# Patient Record
Sex: MALE | Race: WHITE | Employment: FULL TIME | URBAN - METROPOLITAN AREA
[De-identification: names, ages, dates, MRNs, and addresses within clinical notes are randomized per-mention and may not be internally consistent; named-entity substitution may affect disease eponyms.]

---

## 2022-10-12 ENCOUNTER — APPOINTMENT (OUTPATIENT)
Dept: CT IMAGING | Age: 22
End: 2022-10-12

## 2022-10-12 ENCOUNTER — APPOINTMENT (OUTPATIENT)
Dept: MRI IMAGING | Age: 22
End: 2022-10-12

## 2022-10-12 ENCOUNTER — HOSPITAL ENCOUNTER (EMERGENCY)
Age: 22
Discharge: HOME | End: 2022-10-12
Attending: EMERGENCY MEDICINE

## 2022-10-12 VITALS
HEIGHT: 74 IN | SYSTOLIC BLOOD PRESSURE: 138 MMHG | BODY MASS INDEX: 25.67 KG/M2 | RESPIRATION RATE: 16 BRPM | HEART RATE: 60 BPM | DIASTOLIC BLOOD PRESSURE: 92 MMHG | OXYGEN SATURATION: 98 % | WEIGHT: 200 LBS | TEMPERATURE: 98.2 F

## 2022-10-12 DIAGNOSIS — R51.9 NONINTRACTABLE HEADACHE, UNSPECIFIED CHRONICITY PATTERN, UNSPECIFIED HEADACHE TYPE: ICD-10-CM

## 2022-10-12 DIAGNOSIS — R11.2 NAUSEA AND VOMITING, UNSPECIFIED VOMITING TYPE: Primary | ICD-10-CM

## 2022-10-12 LAB
ALBUMIN SERPL-MCNC: 4.5 G/DL (ref 3.5–5)
ALBUMIN/GLOB SERPL: 1.3 {RATIO} (ref 0.4–1.6)
ALP SERPL-CCNC: 64 U/L (ref 50–136)
ALT SERPL-CCNC: 26 U/L (ref 12–65)
ANION GAP SERPL CALC-SCNC: 4 MMOL/L (ref 2–11)
AST SERPL-CCNC: 16 U/L (ref 15–37)
BASOPHILS # BLD: 0 K/UL (ref 0–0.2)
BASOPHILS NFR BLD: 1 % (ref 0–2)
BILIRUB SERPL-MCNC: 0.5 MG/DL (ref 0.2–1.1)
BILIRUB UR QL: NEGATIVE
BUN SERPL-MCNC: 15 MG/DL (ref 6–23)
CALCIUM SERPL-MCNC: 9.6 MG/DL (ref 8.3–10.4)
CHLORIDE SERPL-SCNC: 106 MMOL/L (ref 101–110)
CO2 SERPL-SCNC: 28 MMOL/L (ref 21–32)
CREAT SERPL-MCNC: 1.1 MG/DL (ref 0.8–1.5)
DIFFERENTIAL METHOD BLD: ABNORMAL
EOSINOPHIL # BLD: 0.1 K/UL (ref 0–0.8)
EOSINOPHIL NFR BLD: 1 % (ref 0.5–7.8)
ERYTHROCYTE [DISTWIDTH] IN BLOOD BY AUTOMATED COUNT: 11.9 % (ref 11.9–14.6)
GLOBULIN SER CALC-MCNC: 3.6 G/DL (ref 2.8–4.5)
GLUCOSE SERPL-MCNC: 106 MG/DL (ref 65–100)
GLUCOSE UR QL STRIP.AUTO: NEGATIVE MG/DL
HCT VFR BLD AUTO: 46.2 % (ref 41.1–50.3)
HGB BLD-MCNC: 15.5 G/DL (ref 13.6–17.2)
IMM GRANULOCYTES # BLD AUTO: 0 K/UL (ref 0–0.5)
IMM GRANULOCYTES NFR BLD AUTO: 0 % (ref 0–5)
KETONES UR-MCNC: NEGATIVE MG/DL
LEUKOCYTE ESTERASE UR QL STRIP: NEGATIVE
LIPASE SERPL-CCNC: 162 U/L (ref 73–393)
LYMPHOCYTES # BLD: 1.6 K/UL (ref 0.5–4.6)
LYMPHOCYTES NFR BLD: 31 % (ref 13–44)
MCH RBC QN AUTO: 29.7 PG (ref 26.1–32.9)
MCHC RBC AUTO-ENTMCNC: 33.5 G/DL (ref 31.4–35)
MCV RBC AUTO: 88.5 FL (ref 82–102)
MONOCYTES # BLD: 0.4 K/UL (ref 0.1–1.3)
MONOCYTES NFR BLD: 7 % (ref 4–12)
NEUTS SEG # BLD: 3.2 K/UL (ref 1.7–8.2)
NEUTS SEG NFR BLD: 60 % (ref 43–78)
NITRITE UR QL: NEGATIVE
NRBC # BLD: 0 K/UL (ref 0–0.2)
PH UR: 6.5 [PH] (ref 5–9)
PLATELET # BLD AUTO: 243 K/UL (ref 150–450)
PMV BLD AUTO: 9.1 FL (ref 9.4–12.3)
POTASSIUM SERPL-SCNC: 4.2 MMOL/L (ref 3.5–5.1)
PROT SERPL-MCNC: 8.1 G/DL (ref 6.3–8.2)
PROT UR QL: NEGATIVE MG/DL
RBC # BLD AUTO: 5.22 M/UL (ref 4.23–5.6)
RBC # UR STRIP: NEGATIVE /UL
SERVICE CMNT-IMP: ABNORMAL
SODIUM SERPL-SCNC: 138 MMOL/L (ref 133–143)
SP GR UR: 1.02 (ref 1–1.02)
UROBILINOGEN UR QL: 0.2 EU/DL (ref 0.2–1)
WBC # BLD AUTO: 5.3 K/UL (ref 4.3–11.1)

## 2022-10-12 PROCEDURE — 81003 URINALYSIS AUTO W/O SCOPE: CPT

## 2022-10-12 PROCEDURE — 6370000000 HC RX 637 (ALT 250 FOR IP): Performed by: STUDENT IN AN ORGANIZED HEALTH CARE EDUCATION/TRAINING PROGRAM

## 2022-10-12 PROCEDURE — 70450 CT HEAD/BRAIN W/O DYE: CPT

## 2022-10-12 PROCEDURE — 70553 MRI BRAIN STEM W/O & W/DYE: CPT

## 2022-10-12 PROCEDURE — 83690 ASSAY OF LIPASE: CPT

## 2022-10-12 PROCEDURE — 87086 URINE CULTURE/COLONY COUNT: CPT

## 2022-10-12 PROCEDURE — 96374 THER/PROPH/DIAG INJ IV PUSH: CPT | Performed by: EMERGENCY MEDICINE

## 2022-10-12 PROCEDURE — 2580000003 HC RX 258: Performed by: STUDENT IN AN ORGANIZED HEALTH CARE EDUCATION/TRAINING PROGRAM

## 2022-10-12 PROCEDURE — 85025 COMPLETE CBC W/AUTO DIFF WBC: CPT

## 2022-10-12 PROCEDURE — 6360000004 HC RX CONTRAST MEDICATION: Performed by: STUDENT IN AN ORGANIZED HEALTH CARE EDUCATION/TRAINING PROGRAM

## 2022-10-12 PROCEDURE — A9579 GAD-BASE MR CONTRAST NOS,1ML: HCPCS | Performed by: STUDENT IN AN ORGANIZED HEALTH CARE EDUCATION/TRAINING PROGRAM

## 2022-10-12 PROCEDURE — 80053 COMPREHEN METABOLIC PANEL: CPT

## 2022-10-12 PROCEDURE — 99285 EMERGENCY DEPT VISIT HI MDM: CPT | Performed by: EMERGENCY MEDICINE

## 2022-10-12 RX ORDER — SODIUM CHLORIDE 0.9 % (FLUSH) 0.9 %
10 SYRINGE (ML) INJECTION AS NEEDED
Status: COMPLETED | OUTPATIENT
Start: 2022-10-12 | End: 2022-10-12

## 2022-10-12 RX ORDER — PROCHLORPERAZINE MALEATE 10 MG
10 TABLET ORAL EVERY 6 HOURS PRN
Qty: 20 TABLET | Refills: 2 | Status: SHIPPED | OUTPATIENT
Start: 2022-10-12

## 2022-10-12 RX ORDER — ACETAMINOPHEN 500 MG
1000 TABLET ORAL
Status: COMPLETED | OUTPATIENT
Start: 2022-10-12 | End: 2022-10-12

## 2022-10-12 RX ADMIN — SODIUM CHLORIDE, PRESERVATIVE FREE 10 ML: 5 INJECTION INTRAVENOUS at 12:51

## 2022-10-12 RX ADMIN — ACETAMINOPHEN 1000 MG: 500 TABLET, FILM COATED ORAL at 10:08

## 2022-10-12 RX ADMIN — GADOTERIDOL 19 ML: 279.3 INJECTION, SOLUTION INTRAVENOUS at 12:51

## 2022-10-12 ASSESSMENT — ENCOUNTER SYMPTOMS
EYE DISCHARGE: 0
SHORTNESS OF BREATH: 0
SORE THROAT: 0
BLOOD IN STOOL: 0
VOMITING: 1
EYE REDNESS: 0
CHEST TIGHTNESS: 0
COLOR CHANGE: 0
SINUS PRESSURE: 0
VOICE CHANGE: 0
PHOTOPHOBIA: 0
DIARRHEA: 0
WHEEZING: 0
ABDOMINAL PAIN: 0
COUGH: 0
CONSTIPATION: 0
EYE PAIN: 0
RHINORRHEA: 0
APNEA: 0
NAUSEA: 1

## 2022-10-12 ASSESSMENT — PAIN SCALES - GENERAL: PAINLEVEL_OUTOF10: 4

## 2022-10-12 ASSESSMENT — VISUAL ACUITY: OU: 1

## 2022-10-12 ASSESSMENT — PAIN DESCRIPTION - FREQUENCY: FREQUENCY: INTERMITTENT

## 2022-10-12 ASSESSMENT — PAIN DESCRIPTION - LOCATION: LOCATION: ABDOMEN

## 2022-10-12 ASSESSMENT — PAIN DESCRIPTION - PAIN TYPE: TYPE: ACUTE PAIN

## 2022-10-12 ASSESSMENT — PAIN - FUNCTIONAL ASSESSMENT: PAIN_FUNCTIONAL_ASSESSMENT: 0-10

## 2022-10-12 ASSESSMENT — PAIN DESCRIPTION - DESCRIPTORS: DESCRIPTORS: ACHING

## 2022-10-12 NOTE — DISCHARGE INSTRUCTIONS
Your brain MRI was normal today. Your lab work today was normal.  You did not show any signs of internal bleeding. As of yet, we do not have a clear cause of your symptoms. It is important you follow-up on an outpatient basis to determine the cause of your vomiting and headaches. Please follow with your primary care provider. I provided you a number to call to establish care with 1 for further management. As we discussed, the neurologist would like to see you on an outpatient basis. Please call Leeanna Roblero NP to schedule an appointment. The number is above. In the meantime, I am writing you for Compazine which can help with nausea. Please return here if you develop any new or worsening symptoms, especially changes in your vision, loss of peripheral vision, fevers. We would love to help you get a primary care doctor for follow-up after your emergency department visit. Please call [phone] between 7AM - 6PM Monday to Friday. A care navigator will be able to assist you with setting up a doctor close to your home.

## 2022-10-12 NOTE — ED PROVIDER NOTES
Emergency Department Provider Note                   PCP:                None Provider               Age: 25 y.o. Sex: male       ICD-10-CM    1. Nausea and vomiting, unspecified vomiting type  R11.2       2. Nonintractable headache, unspecified chronicity pattern, unspecified headache type  R51.9 [address]     [address]          DISPOSITION Decision To Discharge 10/12/2022 02:02:15 PM        MDM  Number of Diagnoses or Management Options  Nausea and vomiting, unspecified vomiting type  Nonintractable headache, unspecified chronicity pattern, unspecified headache type  Diagnosis management comments: 9:43 AM  Ddx includes intracranial mass, elevated ICP, meningitis, GI bleed. Low suspicion for elevated ICP given no visual symptoms, no papilledema, not overweight, not female. Low suspicion for meningitis given the length of symptoms, lack of fever, lack of rashes, no meningeal signs. .  We will CT head to evaluate for intracranial mass. We will proceed with guaiac testing to evaluate for internal bleeding. If all is negative, will discharge with Compazine to go home with and primary care follow-up. 11:06 AM  CT scan showed possible hypoattenuation in occipital lobe. Possible artifact. Radiologist recommended MRI for further delineation. We will proceed with this imaging. Discussed this patient with my attending physician Dr. Deepak Severino. He recommended consulting neurology to see if they would like to see this patient on an outpatient basis. We will consult neurology now. Labs were all unremarkable. Guaiac was negative. Vision and visual fields normal.    2:23 PM  MRI brain was within normal limits and without acute abnormality. Consulted Dr. Edith Olmedo of neurology to update him on patient. He advised this patient could be followed on an outpatient basis.   Given the patient's grossly unremarkable exam, unremarkable work-up, lack of symptoms at time, I do feel he is safe to be discharged home. I counseled him on warning signs return immediately for including but not limited to visual changes, fevers. Counseled him on importance of following up with neurology and primary care provider for further management of his symptoms. Patient and mother verbalized understanding and are in agreement with treatment plan. Patient was discharged in stable condition. Compazine plus Fioricet. Follow-up with family doctor and neurologist.  Sj Lopez       Amount and/or Complexity of Data Reviewed  Clinical lab tests: ordered and reviewed  Tests in the radiology section of CPT®: ordered and reviewed  Discuss the patient with other providers: yes (Dr. Latrice Edwards of neurology, Dr. Sj Lopez)  Independent visualization of images, tracings, or specimens: yes    Risk of Complications, Morbidity, and/or Mortality  Presenting problems: moderate  Diagnostic procedures: moderate  Management options: moderate    Patient Progress  Patient progress: stable       ED Course as of 10/12/22 1425   Wed Oct 12, 2022   1155 11:55 AM  Patient just got taken to MRI [NR]   1401 2:01 PM  MRI within normal limits.  [NR]      ED Course User Index  [NR] LancasterLARISSA Delacruz        Orders Placed This Encounter   Procedures    Urine Culture    CT HEAD WO CONTRAST    MRI BRAIN W WO CONTRAST    CBC with Diff    CMP    Lipase    [address]    [address] NPO    POCT Urine Dipstick    POCT Urinalysis no Micro    Saline lock IV        Medications   acetaminophen (TYLENOL) tablet 1,000 mg (1,000 mg Oral Given 10/12/22 1008)   sodium chloride flush 0.9 % injection 10 mL (10 mLs IntraVENous Given 10/12/22 1251)   gadoteridol (PROHANCE) injection 19 mL (19 mLs IntraVENous Given 10/12/22 1251)       New Prescriptions    PROCHLORPERAZINE (COMPAZINE) 10 MG TABLET    Take 1 tablet by mouth every 6 hours as needed (nausea)        Mauri Jean-Baptiste is a 25 y.o. male who presents to the Emergency Department with chief complaint of    Chief Complaint   Patient presents with    Abdominal Pain      75-year-old male patient with no significant past medical history presents today complaining of vomiting in the morning for the past several months. He reports he typically wakes up feeling hot and nauseated. Immediately goes to the bathroom and vomits. Reports after vomiting he will sometimes get a minor headache that last for an hour or 2 and is amendable with Tylenol. He states this happens almost every morning. He cannot think of anything that triggers it. He does note that for the past week he has had some darker stool than normal but otherwise denies any associated symptoms. He denies headaches being worst of life, sudden in onset, maximal in onset. He denies fevers, neck stiffness, rashes. He denies visual changes, blurry vision, double vision, loss of vision. He denies abdominal pain despite what the chief complaint of this chart says. He denies chest pain or shortness of breath. He denies unintentional weight loss. Patient is primary historian and quality is reliable. The history is provided by the patient and a parent. No  was used. Review of Systems   Constitutional:  Negative for appetite change, chills, fatigue, fever and unexpected weight change. HENT:  Negative for congestion, ear pain, hearing loss, postnasal drip, rhinorrhea, sinus pressure, sore throat and voice change. Eyes:  Negative for photophobia, pain, discharge, redness and visual disturbance. Respiratory:  Negative for apnea, cough, chest tightness, shortness of breath and wheezing. Cardiovascular:  Negative for chest pain, palpitations and leg swelling. Gastrointestinal:  Positive for nausea and vomiting. Negative for abdominal pain, blood in stool, constipation and diarrhea. Endocrine: Negative for polydipsia, polyphagia and polyuria.    Genitourinary: Negative for decreased urine volume, dysuria, flank pain, frequency and hematuria. Musculoskeletal:  Negative for arthralgias, joint swelling, myalgias and neck stiffness. Skin:  Negative for color change, rash and wound. Neurological:  Positive for headaches. Negative for dizziness, syncope, speech difficulty, weakness and light-headedness. Hematological:  Negative for adenopathy. Does not bruise/bleed easily. Psychiatric/Behavioral:  Negative for confusion, self-injury, sleep disturbance and suicidal ideas. All other systems reviewed and are negative. History reviewed. No pertinent past medical history. History reviewed. No pertinent surgical history. History reviewed. No pertinent family history. Social History     Socioeconomic History    Marital status: Single     Spouse name: None    Number of children: None    Years of education: None    Highest education level: None         Patient has no known allergies. Previous Medications    No medications on file        Vitals signs and nursing note reviewed. Patient Vitals for the past 4 hrs:   Pulse BP SpO2   10/12/22 1136 -- -- 98 %   10/12/22 1130 -- (!) 138/92 99 %   10/12/22 1056 -- -- 98 %   10/12/22 1055 -- 121/77 --   10/12/22 1030 60 (!) 136/96 --   10/12/22 1029 -- -- 99 %          Physical Exam  Vitals and nursing note reviewed. Exam conducted with a chaperone present Katrin Enriquez RN present for exam). Constitutional:       General: He is not in acute distress. Appearance: Normal appearance. He is normal weight. He is not ill-appearing, toxic-appearing or diaphoretic. Comments: Resting comfortably in stretcher. Pleasant cooperative. No acute distress. HENT:      Head: Normocephalic and atraumatic. Comments: Nontender to palpation. No temporal artery tenderness or scalp tenderness. No deformities or signs of fracture or trauma.      Right Ear: Tympanic membrane, ear canal and external ear normal. Left Ear: Tympanic membrane, ear canal and external ear normal.      Nose: Nose normal.      Mouth/Throat:      Mouth: Mucous membranes are moist.      Pharynx: Oropharynx is clear. No pharyngeal swelling or oropharyngeal exudate. Eyes:      General: Lids are normal. Lids are everted, no foreign bodies appreciated. Vision grossly intact. Gaze aligned appropriately. No visual field deficit or scleral icterus. Right eye: No discharge. Left eye: No discharge. Extraocular Movements: Extraocular movements intact. Right eye: Normal extraocular motion and no nystagmus. Left eye: Normal extraocular motion and no nystagmus. Conjunctiva/sclera: Conjunctivae normal.      Right eye: Right conjunctiva is not injected. Left eye: Left conjunctiva is not injected. Pupils: Pupils are equal, round, and reactive to light. Funduscopic exam:     Right eye: No papilledema. Red reflex present. Left eye: No papilledema. Red reflex present. Visual Fields: Right eye visual fields normal and left eye visual fields normal.      Comments: Vision grossly normal for patient. No photosensitivity. No obvious papilledema although difficult to assess without fully dilated eye. Full EOMs. Visual fields normal.   Neck:      Comments: No meningeal signs. Full ROM of neck without pain. Patient able to touch chin to chest with mouth closed. Cardiovascular:      Rate and Rhythm: Normal rate and regular rhythm. Pulses: Normal pulses. Heart sounds: Normal heart sounds. No murmur heard. Pulmonary:      Effort: Pulmonary effort is normal. No respiratory distress. Breath sounds: Normal breath sounds. No wheezing, rhonchi or rales. Abdominal:      General: Abdomen is flat. Bowel sounds are normal.      Palpations: Abdomen is soft. Tenderness: There is no abdominal tenderness. There is no guarding or rebound. Hernia: No hernia is present.       Comments: Normal abdominal exam without peritoneal signs. No masses felt. Genitourinary:     Penis: Normal.       Testes: Normal.      Prostate: Normal.      Rectum: Normal. Guaiac result negative. Comments: Negative guaiac  Musculoskeletal:         General: Normal range of motion. Cervical back: Normal range of motion and neck supple. No rigidity or tenderness. Right lower leg: No edema. Left lower leg: No edema. Lymphadenopathy:      Cervical: No cervical adenopathy. Skin:     General: Skin is warm and dry. Capillary Refill: Capillary refill takes less than 2 seconds. Findings: No lesion or rash. Comments: No rashes or petechiae. No lymphadenopathy. Neurological:      General: No focal deficit present. Mental Status: He is alert and oriented to person, place, and time. Mental status is at baseline. GCS: GCS eye subscore is 4. GCS verbal subscore is 5. GCS motor subscore is 6. Cranial Nerves: Cranial nerves 2-12 are intact. No cranial nerve deficit, dysarthria or facial asymmetry. Sensory: Sensation is intact. No sensory deficit. Motor: Motor function is intact. No weakness, tremor, atrophy, abnormal muscle tone, seizure activity or pronator drift. Coordination: Coordination is intact. Romberg sign negative. Coordination normal. Finger-Nose-Finger Test and Heel to Santosh Lares Test normal. Rapid alternating movements normal.      Gait: Gait is intact. Gait and tandem walk normal.      Deep Tendon Reflexes: Reflexes are normal and symmetric. Comments: Normal neurologic exam without focal neurologic deficit. Psychiatric:         Mood and Affect: Mood normal.         Behavior: Behavior normal.         Thought Content:  Thought content normal.         Judgment: Judgment normal.          Procedures    Results for orders placed or performed during the hospital encounter of 10/12/22   CT HEAD WO CONTRAST    Narrative    EXAM: CT HEAD WITHOUT CONTRAST    INDICATION: vomiting in the morning for several months. headaches. COMPARISON: None. TECHNIQUE: Contiguous axial images were obtained from the skull base through the  vertex without IV contrast. Radiation dose reduction techniques were used for  this study. Our CT scanners use one or all of the following: Automated exposure  control, adjustment of the mA and/or kV according to patient size, iterative  reconstruction. FINDINGS:   Subtle asymmetric hypoattenuation with loss of gray-white matter differentiation  of the left occipital lobe. No hydrocephalus or midline shift. No extra-axial  mass or hemorrhage. The basal cisterns are patent. The visualized portions of the orbits are normal. The mastoid air cells and  paranasal sinuses are patent. The visualized vascular structures have an unremarkable noncontrast appearance. The calvarium and soft tissues appear normal.      Impression    Subtle asymmetric hypoattenuation with loss of gray-white matter differentiation  of the left occipital lobe, indeterminate and possibly artifactual. Recommend  brain MRI with and without contrast.   MRI BRAIN W WO CONTRAST    Narrative    Exam: MRI BRAIN W WO CONTRAST on 10/12/2022 1:43 PM    Clinical History: The Male patient is 25years old presenting for nausea and  vomiting. Comparison:  Head CT 10/12/2022. Technique:   T2-weighted, T1-weighted, FLAIR, and diffusion-weighted transaxial  , T1 sagittal, and gradient echo coronal pulse sequences were initially  performed. Following  the administration of contrast, additional T1-weighted  transaxial and coronal sequences were performed. 19 mL of ProHance contrast was  administered intravenously. Findings: There is no evidence of acute intracranial abnormality. No evidence of  restricted diffusion is seen to suggest acute ischemia. There are no areas of abnormal enhancement. There are no abnormal extra-axial  fluid collections.  No evidence of mass or mass effect is seen. Expected flow  voids are maintained in the major intracranial vessels. The cerebellum and brainstem are unremarkable. There is no evidence of Chiari  malformation. The ventricular system and CSF containing spaces are unremarkable in appearance. Visualized extracranial soft tissues are unremarkable. The paranasal sinuses are well pneumatized and aerated. Impression    1. No acute intracranial abnormality.     CPT Code:  72797             CBC with Diff   Result Value Ref Range    WBC 5.3 4.3 - 11.1 K/uL    RBC 5.22 4.23 - 5.6 M/uL    Hemoglobin 15.5 13.6 - 17.2 g/dL    Hematocrit 46.2 41.1 - 50.3 %    MCV 88.5 82 - 102 FL    MCH 29.7 26.1 - 32.9 PG    MCHC 33.5 31.4 - 35.0 g/dL    RDW 11.9 11.9 - 14.6 %    Platelets 874 821 - 561 K/uL    MPV 9.1 (L) 9.4 - 12.3 FL    nRBC 0.00 0.0 - 0.2 K/uL    Differential Type AUTOMATED      Seg Neutrophils 60 43 - 78 %    Lymphocytes 31 13 - 44 %    Monocytes 7 4.0 - 12.0 %    Eosinophils % 1 0.5 - 7.8 %    Basophils 1 0.0 - 2.0 %    Immature Granulocytes 0 0.0 - 5.0 %    Segs Absolute 3.2 1.7 - 8.2 K/UL    Absolute Lymph # 1.6 0.5 - 4.6 K/UL    Absolute Mono # 0.4 0.1 - 1.3 K/UL    Absolute Eos # 0.1 0.0 - 0.8 K/UL    Basophils Absolute 0.0 0.0 - 0.2 K/UL    Absolute Immature Granulocyte 0.0 0.0 - 0.5 K/UL   CMP   Result Value Ref Range    Sodium 138 133 - 143 mmol/L    Potassium 4.2 3.5 - 5.1 mmol/L    Chloride 106 101 - 110 mmol/L    CO2 28 21 - 32 mmol/L    Anion Gap 4 2 - 11 mmol/L    Glucose 106 (H) 65 - 100 mg/dL    BUN 15 6 - 23 MG/DL    Creatinine 1.10 0.8 - 1.5 MG/DL    Est, Glom Filt Rate >60 >60 ml/min/1.73m2    Calcium 9.6 8.3 - 10.4 MG/DL    Total Bilirubin 0.5 0.2 - 1.1 MG/DL    ALT 26 12 - 65 U/L    AST 16 15 - 37 U/L    Alk Phosphatase 64 50 - 136 U/L    Total Protein 8.1 6.3 - 8.2 g/dL    Albumin 4.5 3.5 - 5.0 g/dL    Globulin 3.6 2.8 - 4.5 g/dL    Albumin/Globulin Ratio 1.3 0.4 - 1.6     Lipase   Result Value Ref Range    Lipase 162 73 - 393 U/L   POCT Urinalysis no Micro   Result Value Ref Range    Specific Gravity, Urine, POC 1.025 (H) 1.001 - 1.023      pH, Urine, POC 6.5 5.0 - 9.0      Protein, Urine, POC Negative NEG mg/dL    Glucose, UA POC Negative NEG mg/dL    Ketones, Urine, POC Negative NEG mg/dL    Bilirubin, Urine, POC Negative NEG      Blood, UA POC Negative NEG      URINE UROBILINOGEN POC 0.2 0.2 - 1.0 EU/dL    Nitrate, Urine, POC Negative NEG      Leukocyte Est, UA POC Negative NEG      Performed by: Crissy Cornejo         MRI BRAIN W WO CONTRAST   Final Result      1. No acute intracranial abnormality. CPT Code:  95692                     CT HEAD WO CONTRAST   Final Result   Subtle asymmetric hypoattenuation with loss of gray-white matter differentiation   of the left occipital lobe, indeterminate and possibly artifactual. Recommend   brain MRI with and without contrast.                          Voice dictation software was used during the making of this note. This software is not perfect and grammatical and other typographical errors may be present. This note has not been completely proofread for errors.      Cristopher Mojica  10/12/22 2352

## 2022-10-12 NOTE — Clinical Note
Mary Alice Montgomery was seen and treated in our emergency department on 10/12/2022. He may return to work on 10/17/2022. If you have any questions or concerns, please don't hesitate to call.       Elvia Thompson, [address]

## 2022-10-12 NOTE — ED TRIAGE NOTES
Patient arrives to the ED via POV and ambulatory to triage. Patient states for 2 weeks he feels sick in the mornings and has missed work. Patient wakes up feeling hot, nauseated with vomiting. Patient states this issue is just in the morning and starts to feel better through the day, patient also states his stool has been darker than normal and dark green at times.

## 2022-10-14 ENCOUNTER — OFFICE VISIT (OUTPATIENT)
Dept: INTERNAL MEDICINE CLINIC | Facility: CLINIC | Age: 22
End: 2022-10-14

## 2022-10-14 VITALS
WEIGHT: 204 LBS | HEART RATE: 70 BPM | DIASTOLIC BLOOD PRESSURE: 84 MMHG | SYSTOLIC BLOOD PRESSURE: 146 MMHG | OXYGEN SATURATION: 99 % | BODY MASS INDEX: 26.18 KG/M2 | HEIGHT: 74 IN

## 2022-10-14 DIAGNOSIS — R51.9 NONINTRACTABLE EPISODIC HEADACHE, UNSPECIFIED HEADACHE TYPE: ICD-10-CM

## 2022-10-14 DIAGNOSIS — R11.2 NAUSEA AND VOMITING, UNSPECIFIED VOMITING TYPE: Primary | ICD-10-CM

## 2022-10-14 LAB
BACTERIA SPEC CULT: NORMAL
SERVICE CMNT-IMP: NORMAL

## 2022-10-14 PROCEDURE — 99204 OFFICE O/P NEW MOD 45 MIN: CPT | Performed by: STUDENT IN AN ORGANIZED HEALTH CARE EDUCATION/TRAINING PROGRAM

## 2022-10-14 ASSESSMENT — PATIENT HEALTH QUESTIONNAIRE - PHQ9
2. FEELING DOWN, DEPRESSED OR HOPELESS: 0
SUM OF ALL RESPONSES TO PHQ QUESTIONS 1-9: 0
SUM OF ALL RESPONSES TO PHQ QUESTIONS 1-9: 0
SUM OF ALL RESPONSES TO PHQ9 QUESTIONS 1 & 2: 0
SUM OF ALL RESPONSES TO PHQ QUESTIONS 1-9: 0
SUM OF ALL RESPONSES TO PHQ QUESTIONS 1-9: 0
1. LITTLE INTEREST OR PLEASURE IN DOING THINGS: 0

## 2022-10-14 ASSESSMENT — ENCOUNTER SYMPTOMS
VOMITING: 1
SHORTNESS OF BREATH: 0
NAUSEA: 1
ABDOMINAL PAIN: 0

## 2022-10-14 NOTE — PATIENT INSTRUCTIONS
Take prilosec or nexium 40mg total every night for 2 weeks, stop if you do not have relief of symptoms  Increase your fiber intake (metamucil)  Eat fruits and vegetables, try a small piece every day, it is good for you.

## 2022-10-14 NOTE — PROGRESS NOTES
SUBJECTIVE:   Tresa Gonzalez is a 25 y.o. male seen for a visit regarding   Chief Complaint   Patient presents with    New Patient     New pt here to get established    Nausea & Vomiting     For last 2 weeks-- has gone to ER for it. Diarrhea     Sometimes with diarrhea as well    Headache     Occ. 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 Guaiac was negative. CT head without contrast showed subtle asymmetric hypoattenuation of left occipital lobe, MRI brain with and without contrast showed no evidence of acute intracranial abnormality or restricted diffusion to suggest acute ischemia, no areas of abnormal enhancement or extra-axial fluid collections, no mass or mass-effect. Still having nausea, vomiting in the AM which first noticed a few months ago, late July however has been more frequent. He has been trying to eat earlier and avoid steak. Wakes up in the AM hot, nauseated, has diarrhea described as loose stool, vomits. He has not picked up nausea medication yet prescribed from ED. Noticed dark green stools at one point however now they are normal.  No abdominal pain, weight loss, bloody stool, hematemesis. Nausea goes away throughout the day. Denies abdominal pain, sometimes he gets reflux. He was trying nexium every night, the other day tried taking 2. He doesn't eat fruits or vegetables due to taste. He does smoke marijuana occasionally. Headaches occasionally after vomiting, nonpositional. Occipital, pounding. Headache present even with tylenol. He was referred to a neurologist from the ED. Past Medical History, Past Surgical History, Family history, Social History, and Medications were all reviewed with the patient today and updated as necessary.        Current Outpatient Medications   Medication Sig Dispense Refill    prochlorperazine (COMPAZINE) 10 MG tablet Take 1 tablet by mouth every 6 hours as needed (nausea) 20 tablet 2     No current facility-administered medications for this visit. No Known Allergies  Patient Active Problem List   Diagnosis    Nausea and vomiting       Social History     Tobacco Use    Smoking status: Never    Smokeless tobacco: Current     Types: Chew   Substance Use Topics    Alcohol use: Not Currently          Review of Systems   Constitutional:  Negative for chills and fever. HENT:  Negative for congestion. Eyes:  Negative for visual disturbance. Respiratory:  Negative for shortness of breath. Cardiovascular:  Negative for chest pain. Gastrointestinal:  Positive for nausea and vomiting. Negative for abdominal pain. Musculoskeletal:  Negative for arthralgias. Neurological:  Positive for headaches. Negative for syncope. OBJECTIVE:    Vitals:    10/14/22 1057   BP: (!) 146/84   Pulse: 70   SpO2: 99%   Weight: 204 lb (92.5 kg)   Height: 6' 2\" (1.88 m)        Physical Exam  Constitutional:       General: He is not in acute distress. Appearance: Normal appearance. HENT:      Head: Normocephalic and atraumatic. Eyes:      Extraocular Movements: Extraocular movements intact. Cardiovascular:      Rate and Rhythm: Normal rate and regular rhythm. Heart sounds: Normal heart sounds. Pulmonary:      Breath sounds: Normal breath sounds. Abdominal:      General: There is no distension. Palpations: Abdomen is soft. Tenderness: There is no abdominal tenderness. There is no guarding. Musculoskeletal:         General: No deformity. Skin:     General: Skin is warm and dry. Neurological:      Mental Status: He is alert. Mental status is at baseline. Psychiatric:         Mood and Affect: Mood normal.          Medical problems and test results were reviewed with the patient today. ASSESSMENT and PLAN     1. Nausea and vomiting, unspecified vomiting type  2. Nonintractable episodic headache, unspecified headache type     Symptoms could be from poor diet or marijuana use. Advised patient to try Nexium 40 mg every night for his symptoms, stop marijuana use, eat fruits and vegetables and increase fiber in his diet. Advised him to try nausea medication and let us know if this does not help. If his symptoms continue after these interventions, will refer to GI. Advised patient if symptoms worsen or if he develops fever or chills to let us know or seek emergency medical assistance.     Return in about 4 weeks (around 11/11/2022) for routine follow up, may be virtual.     Elaina Tovar MD

## 2023-08-07 ENCOUNTER — APPOINTMENT (OUTPATIENT)
Dept: ULTRASOUND IMAGING | Age: 23
End: 2023-08-07

## 2023-08-07 ENCOUNTER — APPOINTMENT (OUTPATIENT)
Dept: CT IMAGING | Age: 23
End: 2023-08-07

## 2023-08-07 ENCOUNTER — HOSPITAL ENCOUNTER (EMERGENCY)
Age: 23
Discharge: HOME | End: 2023-08-07
Attending: EMERGENCY MEDICINE

## 2023-08-07 VITALS
OXYGEN SATURATION: 100 % | WEIGHT: 195 LBS | HEIGHT: 74 IN | HEART RATE: 68 BPM | SYSTOLIC BLOOD PRESSURE: 145 MMHG | DIASTOLIC BLOOD PRESSURE: 85 MMHG | TEMPERATURE: 97.6 F | RESPIRATION RATE: 18 BRPM | BODY MASS INDEX: 25.03 KG/M2

## 2023-08-07 DIAGNOSIS — R59.0 INGUINAL LYMPHADENOPATHY: Primary | ICD-10-CM

## 2023-08-07 LAB
ANION GAP SERPL CALC-SCNC: 5 MMOL/L (ref 2–11)
APPEARANCE UR: CLEAR
BILIRUB UR QL: NEGATIVE
BUN SERPL-MCNC: 10 MG/DL (ref 6–23)
CALCIUM SERPL-MCNC: 10.6 MG/DL (ref 8.3–10.4)
CHLORIDE SERPL-SCNC: 105 MMOL/L (ref 101–110)
CO2 SERPL-SCNC: 29 MMOL/L (ref 21–32)
COLOR UR: ABNORMAL
CREAT SERPL-MCNC: 1.2 MG/DL (ref 0.8–1.5)
ERYTHROCYTE [DISTWIDTH] IN BLOOD BY AUTOMATED COUNT: 12 % (ref 11.9–14.6)
GLUCOSE SERPL-MCNC: 95 MG/DL (ref 65–100)
GLUCOSE UR STRIP.AUTO-MCNC: NEGATIVE MG/DL
HCT VFR BLD AUTO: 49.3 % (ref 41.1–50.3)
HGB BLD-MCNC: 16.5 G/DL (ref 13.6–17.2)
HGB UR QL STRIP: NEGATIVE
KETONES UR QL STRIP.AUTO: NEGATIVE MG/DL
LEUKOCYTE ESTERASE UR QL STRIP.AUTO: NEGATIVE
MCH RBC QN AUTO: 29.6 PG (ref 26.1–32.9)
MCHC RBC AUTO-ENTMCNC: 33.5 G/DL (ref 31.4–35)
MCV RBC AUTO: 88.4 FL (ref 82–102)
NITRITE UR QL STRIP.AUTO: NEGATIVE
NRBC # BLD: 0 K/UL (ref 0–0.2)
PH UR STRIP: 8 (ref 5–9)
PLATELET # BLD AUTO: 219 K/UL (ref 150–450)
PMV BLD AUTO: 9.3 FL (ref 9.4–12.3)
POTASSIUM SERPL-SCNC: 4.5 MMOL/L (ref 3.5–5.1)
PROT UR STRIP-MCNC: NEGATIVE MG/DL
RBC # BLD AUTO: 5.58 M/UL (ref 4.23–5.6)
SODIUM SERPL-SCNC: 139 MMOL/L (ref 133–143)
SP GR UR REFRACTOMETRY: >1.035 (ref 1–1.02)
UROBILINOGEN UR QL STRIP.AUTO: 0.2 EU/DL (ref 0.2–1)
WBC # BLD AUTO: 5.6 K/UL (ref 4.3–11.1)

## 2023-08-07 PROCEDURE — 87591 N.GONORRHOEAE DNA AMP PROB: CPT

## 2023-08-07 PROCEDURE — 87491 CHLMYD TRACH DNA AMP PROBE: CPT

## 2023-08-07 PROCEDURE — 6360000004 HC RX CONTRAST MEDICATION: Performed by: EMERGENCY MEDICINE

## 2023-08-07 PROCEDURE — 81003 URINALYSIS AUTO W/O SCOPE: CPT

## 2023-08-07 PROCEDURE — 85027 COMPLETE CBC AUTOMATED: CPT

## 2023-08-07 PROCEDURE — 99285 EMERGENCY DEPT VISIT HI MDM: CPT

## 2023-08-07 PROCEDURE — 72193 CT PELVIS W/DYE: CPT

## 2023-08-07 PROCEDURE — 80048 BASIC METABOLIC PNL TOTAL CA: CPT

## 2023-08-07 RX ORDER — DOXYCYCLINE HYCLATE 100 MG
100 TABLET ORAL 2 TIMES DAILY
Qty: 20 TABLET | Refills: 0 | Status: SHIPPED | OUTPATIENT
Start: 2023-08-07 | End: 2023-08-17

## 2023-08-07 RX ADMIN — IOPAMIDOL 100 ML: 755 INJECTION, SOLUTION INTRAVENOUS at 10:40

## 2023-08-07 ASSESSMENT — ENCOUNTER SYMPTOMS
DIARRHEA: 0
VOMITING: 0
NAUSEA: 0
ABDOMINAL PAIN: 0

## 2023-08-07 ASSESSMENT — PAIN DESCRIPTION - DESCRIPTORS: DESCRIPTORS: ACHING

## 2023-08-07 ASSESSMENT — PAIN SCALES - GENERAL: PAINLEVEL_OUTOF10: 7

## 2023-08-07 ASSESSMENT — PAIN DESCRIPTION - ORIENTATION: ORIENTATION: RIGHT

## 2023-08-07 ASSESSMENT — LIFESTYLE VARIABLES
HOW OFTEN DO YOU HAVE A DRINK CONTAINING ALCOHOL: NEVER
HOW MANY STANDARD DRINKS CONTAINING ALCOHOL DO YOU HAVE ON A TYPICAL DAY: PATIENT DOES NOT DRINK

## 2023-08-07 ASSESSMENT — PAIN DESCRIPTION - LOCATION: LOCATION: GROIN

## 2023-08-07 ASSESSMENT — PAIN - FUNCTIONAL ASSESSMENT: PAIN_FUNCTIONAL_ASSESSMENT: 0-10

## 2023-08-07 NOTE — ED PROVIDER NOTES
Emergency Department Provider Note       PCP: On File Not (Inactive)   Age: 21 y.o. Sex: male     DISPOSITION Decision To Discharge 08/07/2023 01:15:32 PM       ICD-10-CM    1. Inguinal lymphadenopathy  R59.0           Medical Decision Making     Complexity of Problems Addressed:  1 or more acute illnesses that pose a threat to life or bodily function. Data Reviewed and Analyzed:  Category 1:   I independently ordered and reviewed each unique test.         Category 2:   I interpreted the CT Scan no hernia, inquinal lymph node present. Category 3: Discussion of management or test interpretation. Patient presented with concern for hernia. He had swelling and bulging about 3 cm diameter in the right medial thigh distal to the right inguinal area. Area was tender but not warm or erythematous. It had the feeling of a lipoma or possibly a lymph node. Bedside ED ultrasound showed a fluid collection with small amounts of debris but no intestinal motility. CT scan confirmed that it was not a femoral hernia and that it was an inguinal lymph node. Bladder wall was thickened on CT but nondistended. Urine sent for urinalysis. Patient denies STD D risk factors but will be tested with a  chlamydia probe. I will place patient on antibiotics and have him follow-up with a primary care provider in 2 weeks for follow-up and recheck. Referral at that time for biopsy if needed. I discussed the case with surgery and they asked for primary care follow-up versus following up with them. The management of this patient was discussed with an external consultant. Surgery preferred CT over ultrasound to determine if there is femoral hernia. 1:18 PM  Inguinal lymph node. No hernia. No UTI. Treating lymphadenitis with doxycycline. Urine sent for gonorrhea and Chlamydia testing but patient denies risk for this. Follow-up with family doctor provided in 1 to 2 weeks for follow-up and recheck.   Surgery asked that

## 2023-08-07 NOTE — DISCHARGE INSTRUCTIONS
The antibiotic as prescribed twice daily for 10 days. Follow-up in 1 to 2 weeks with the physician provided for follow-up and recheck. They can refer you for biopsy if needed at that time. Return if any new, worsening or concerning symptoms.

## 2023-08-07 NOTE — ED TRIAGE NOTES
Per patient r sided inguinal hernia x7 days prior to arrival. States pain x14 days that has progressively became worse. Patient denies n/v/d states increased flatulence. Denies gu complications. Denies fever/chills.

## 2023-08-09 LAB
C TRACH RRNA SPEC QL NAA+PROBE: NEGATIVE
N GONORRHOEA RRNA SPEC QL NAA+PROBE: NEGATIVE
SPECIMEN SOURCE: NORMAL

## 2024-03-07 ENCOUNTER — HOSPITAL ENCOUNTER (EMERGENCY)
Age: 24
Discharge: HOME | End: 2024-03-07
Attending: EMERGENCY MEDICINE

## 2024-03-07 VITALS
BODY MASS INDEX: 25.41 KG/M2 | OXYGEN SATURATION: 98 % | WEIGHT: 198 LBS | RESPIRATION RATE: 16 BRPM | HEIGHT: 74 IN | HEART RATE: 63 BPM | TEMPERATURE: 98.1 F | SYSTOLIC BLOOD PRESSURE: 145 MMHG | DIASTOLIC BLOOD PRESSURE: 90 MMHG

## 2024-03-07 DIAGNOSIS — K04.7 DENTAL INFECTION: Primary | ICD-10-CM

## 2024-03-07 PROCEDURE — 99283 EMERGENCY DEPT VISIT LOW MDM: CPT

## 2024-03-07 RX ORDER — PENICILLIN V POTASSIUM 500 MG/1
500 TABLET ORAL 3 TIMES DAILY
Qty: 21 TABLET | Refills: 0 | Status: SHIPPED | OUTPATIENT
Start: 2024-03-07 | End: 2024-03-14

## 2024-03-07 ASSESSMENT — PAIN DESCRIPTION - LOCATION: LOCATION: TEETH

## 2024-03-07 ASSESSMENT — LIFESTYLE VARIABLES
HOW MANY STANDARD DRINKS CONTAINING ALCOHOL DO YOU HAVE ON A TYPICAL DAY: PATIENT DOES NOT DRINK
HOW OFTEN DO YOU HAVE A DRINK CONTAINING ALCOHOL: NEVER

## 2024-03-07 ASSESSMENT — PAIN DESCRIPTION - ORIENTATION: ORIENTATION: RIGHT;LOWER

## 2024-03-07 ASSESSMENT — PAIN SCALES - GENERAL
PAINLEVEL_OUTOF10: 10
PAINLEVEL_OUTOF10: 10

## 2024-03-07 ASSESSMENT — ENCOUNTER SYMPTOMS
COUGH: 0
SHORTNESS OF BREATH: 0

## 2024-03-07 NOTE — DISCHARGE INSTRUCTIONS
Please return with any fevers, swelling, worsening symptoms, or additional concerns.    Follow-up with a dentist for reevaluation.      Dental Services     The Emergency Department is not able to provide dental services - tooth extractions, root canal. Though we can provide antibiotics for abccess or infection. Listed below are free or low-cost options.    [facility] [address] [place] [address]   [phone]  Tuesday and Thursday- registration starts at 10am. After registering you are given a time to return  Provides free x-rays, extractions, treatment of infection, and dental hygeine.   Cannot have medicare, medicaid or other medical insurance coverage  Bring proof of [facility]** residency (power bill, for example), Social Security Number and household income documents (including food stamps) as well as any current medications.    (**if you do not live in [facility], contact the [facility] in your home county for the availability of dental services)    [address] [address] [phone]  Monday and Wednesday 8a-5p  Tuesday and Thursday    8a-7p  Friday                               12-5p  Provides Adult and Childrens services. X-rays, extractions, treatment of infection, restorative care  Accepts medicaid, private insurance, self-pay. Fees are on a sliding scale based on income (need to bring documentation)    [facility] [address] [place] [address]     [phone]  Monday - Friday 8am-5p  Accepts medicaid for dental (but not dentures under 21)  Provides xrays, extractions, partials and dentures    [facility] [address] [place] [address], [address], [place] [address] [phone]  Monday- Friday 9a-5p  First Come- First Served  Accepts medicaid, or self pay at or near medicaid price.  Urgent Care only for xrays, extractions fillings and infections    [facility] -- Call Fernando Nichole

## 2024-03-07 NOTE — ED TRIAGE NOTES
Patient a 24 y/o Male reports to the ED with c/c of a dental pain to the lower right side. Had some pain a month ago and it went away. Then on Tuesday it started hurting again. States he has a cracked tooth.

## 2024-03-07 NOTE — ED PROVIDER NOTES
Emergency Department Provider Note       PCP: Not, On File (Inactive)   Age: 23 y.o.   Sex: male     DISPOSITION Decision To Discharge 03/07/2024 11:44:39 AM       ICD-10-CM    1. Dental infection  K04.7           Medical Decision Making     I will treat with some antibiotics for what is likely a developing gingivitis.  I will give him a list of some dental information.     1 acute, uncomplicated illness or injury.  Prescription drug management performed.  Shared medical decision making was utilized in creating the patients health plan today.    I independently ordered and reviewed each unique test.                     History     23-year-old gentleman presents with concerns about pain in his right lower first molar.  He says that the tooth broke couple of days ago and since then it has become more painful.  He denies any significant swelling and has no problems swallowing or breathing.    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    Elements of this note were created using speech recognition software.  As such, errors of speech recognition may be present.        ROS     Review of Systems   Constitutional:  Negative for chills.   HENT:  Negative for congestion.         Dental pain   Respiratory:  Negative for cough and shortness of breath.    Genitourinary:  Negative for dysuria and hematuria.   Neurological:  Negative for dizziness.        Physical Exam     Vitals signs and nursing note reviewed:  Vitals:    03/07/24 1136   BP: (!) 153/98   Pulse: 64   Temp: 98.1 °F (36.7 °C)   TempSrc: Oral   SpO2: 98%   Weight: 89.8 kg (198 lb)   Height: 1.88 m (6' 2\")      Physical Exam  Constitutional:       Appearance: Normal appearance.   HENT:      Mouth/Throat:      Mouth: Mucous membranes are moist.      Comments: Subpar overall oral hygiene with no obvious abscess.  He does have some gum erythema

## 2024-03-07 NOTE — ED NOTES
I have reviewed discharge instructions with the patient.  The patient verbalized understanding.    Patient left ED via Discharge Method: ambulatory to Home with (Mother).    Opportunity for questions and clarification provided.       Patient given 2 scripts.         To continue your aftercare when you leave the hospital, you may receive an automated call from our care team to check in on how you are doing.  This is a free service and part of our promise to provide the best care and service to meet your aftercare needs.” If you have questions, or wish to unsubscribe from this service please call [phone].  Thank you for Choosing our [facility] Emergency Department.        Fahad Marquez RN  03/07/24 0212